# Patient Record
Sex: MALE | Race: WHITE | NOT HISPANIC OR LATINO
[De-identification: names, ages, dates, MRNs, and addresses within clinical notes are randomized per-mention and may not be internally consistent; named-entity substitution may affect disease eponyms.]

---

## 2021-08-06 ENCOUNTER — APPOINTMENT (OUTPATIENT)
Dept: COLORECTAL SURGERY | Facility: CLINIC | Age: 31
End: 2021-08-06
Payer: MEDICAID

## 2021-08-06 ENCOUNTER — NON-APPOINTMENT (OUTPATIENT)
Age: 31
End: 2021-08-06

## 2021-08-06 VITALS
WEIGHT: 224.38 LBS | HEIGHT: 65 IN | TEMPERATURE: 97.9 F | SYSTOLIC BLOOD PRESSURE: 124 MMHG | BODY MASS INDEX: 37.38 KG/M2 | OXYGEN SATURATION: 98 % | DIASTOLIC BLOOD PRESSURE: 67 MMHG | HEART RATE: 68 BPM

## 2021-08-06 DIAGNOSIS — Z78.9 OTHER SPECIFIED HEALTH STATUS: ICD-10-CM

## 2021-08-06 DIAGNOSIS — K62.5 HEMORRHAGE OF ANUS AND RECTUM: ICD-10-CM

## 2021-08-06 DIAGNOSIS — K64.2 THIRD DEGREE HEMORRHOIDS: ICD-10-CM

## 2021-08-06 PROBLEM — Z00.00 ENCOUNTER FOR PREVENTIVE HEALTH EXAMINATION: Status: ACTIVE | Noted: 2021-08-06

## 2021-08-06 PROCEDURE — 99204 OFFICE O/P NEW MOD 45 MIN: CPT

## 2021-08-06 NOTE — HISTORY OF PRESENT ILLNESS
[FreeTextEntry1] : 31 year old male with prolapsing hemorrhoids for about 3 years.  Had been self reducing daily,  but  since last week he has been having a bleeding and pain daily. \par \par Was given hydrocortisone cream  by his pcp but no relief. \par \par BMs are generally daily but this was precipitated by a hard stool.  \par \par Pain with BM's x 3 years.  Prolapse as well daily.  Manually reduces it.\par No known fissure prior\par \par NO bleeding issues.  Heal normally\par \par \par Family hx: distant relative with CAD\par \par cardiac: has some chest pain at times,

## 2021-08-06 NOTE — ASSESSMENT
[FreeTextEntry1] : Daily grade 3 hemorrhoids and has L posterior fissure (? on hemorrhoid and ? post thrombosed hemorrhoid that ulcerated\par Recommend colonoscopy because of bleeding\par Recommend medical clearance.\par Reviewed surgery: hemorrhoidectomy and probable sphincterotomy\par Given all handouts and drawing\par medical clearance needed\par \par Possible stress test

## 2021-08-06 NOTE — REVIEW OF SYSTEMS
[As Noted in HPI] : as noted in HPI [Negative] : Heme/Lymph [Fever] : no fever [Chills] : no chills [Feeling Poorly] : not feeling poorly [Feeling Tired] : not feeling tired [Recent Weight Gain (___ Lbs)] : no recent weight gain [Recent Weight Loss (___ Lbs)] : no recent weight loss [FreeTextEntry7] : ongoing  episodes of constipation,  thinks had scope at age of 17,

## 2021-08-06 NOTE — PHYSICAL EXAM
[Left Side] : on the left side [Manually Reducible] : a manually reducible (grade III) [Normal Breath Sounds] : Normal breath sounds [Normal Heart Sounds] : normal heart sounds [2+] : left 2+ [No Rash or Lesion] : No rash or lesion [Alert] : alert [Oriented to Person] : oriented to person [Oriented to Place] : oriented to place [Oriented to Time] : oriented to time [Calm] : calm [Abdomen Masses] : No abdominal masses [Abdomen Tenderness] : ~T No ~M abdominal tenderness [Excoriation] : no perianal excoriation [Fistula] : no fistulas [Wart] : no warts [Tender, Swollen] : nontender, non-swollen [JVD] : no jugular venous distention  [Thyroid] : the thyroid was abnormal [Carotid Bruits] : no carotid bruits [de-identified] : benign, no masses.  birthmark near umbilicus [de-identified] : left posterior fissure on hemorrhoid that bled with retraction, no anoscopy [de-identified] : L posterior fissure on hemorrhoid [de-identified] : NAD

## 2021-08-09 LAB
BASOPHILS # BLD AUTO: 0.03 K/UL
BASOPHILS NFR BLD AUTO: 0.3 %
EOSINOPHIL # BLD AUTO: 0.22 K/UL
EOSINOPHIL NFR BLD AUTO: 2.4 %
HCT VFR BLD CALC: 37.5 %
HGB BLD-MCNC: 12.9 G/DL
IMM GRANULOCYTES NFR BLD AUTO: 0.6 %
LYMPHOCYTES # BLD AUTO: 3.02 K/UL
LYMPHOCYTES NFR BLD AUTO: 32.5 %
MAN DIFF?: NORMAL
MCHC RBC-ENTMCNC: 29.4 PG
MCHC RBC-ENTMCNC: 34.4 GM/DL
MCV RBC AUTO: 85.4 FL
MONOCYTES # BLD AUTO: 0.73 K/UL
MONOCYTES NFR BLD AUTO: 7.8 %
NEUTROPHILS # BLD AUTO: 5.24 K/UL
NEUTROPHILS NFR BLD AUTO: 56.4 %
PLATELET # BLD AUTO: 188 K/UL
RBC # BLD: 4.39 M/UL
RBC # FLD: 12.3 %
WBC # FLD AUTO: 9.3 K/UL

## 2021-08-19 ENCOUNTER — APPOINTMENT (OUTPATIENT)
Dept: COLORECTAL SURGERY | Facility: HOSPITAL | Age: 31
End: 2021-08-19